# Patient Record
Sex: FEMALE | Race: AMERICAN INDIAN OR ALASKA NATIVE
[De-identification: names, ages, dates, MRNs, and addresses within clinical notes are randomized per-mention and may not be internally consistent; named-entity substitution may affect disease eponyms.]

---

## 2017-01-01 ENCOUNTER — HOSPITAL ENCOUNTER (EMERGENCY)
Dept: HOSPITAL 5 - ED | Age: 32
LOS: 1 days | Discharge: HOME | End: 2017-01-02
Payer: MEDICAID

## 2017-01-01 DIAGNOSIS — J45.909: ICD-10-CM

## 2017-01-01 DIAGNOSIS — F17.200: ICD-10-CM

## 2017-01-01 DIAGNOSIS — K52.9: Primary | ICD-10-CM

## 2017-01-01 DIAGNOSIS — I10: ICD-10-CM

## 2017-01-01 PROCEDURE — 83690 ASSAY OF LIPASE: CPT

## 2017-01-01 PROCEDURE — 96372 THER/PROPH/DIAG INJ SC/IM: CPT

## 2017-01-01 PROCEDURE — 84703 CHORIONIC GONADOTROPIN ASSAY: CPT

## 2017-01-01 PROCEDURE — 81001 URINALYSIS AUTO W/SCOPE: CPT

## 2017-01-01 PROCEDURE — 85025 COMPLETE CBC W/AUTO DIFF WBC: CPT

## 2017-01-01 PROCEDURE — 99283 EMERGENCY DEPT VISIT LOW MDM: CPT

## 2017-01-01 PROCEDURE — 36415 COLL VENOUS BLD VENIPUNCTURE: CPT

## 2017-01-01 PROCEDURE — 80053 COMPREHEN METABOLIC PANEL: CPT

## 2017-01-02 VITALS — SYSTOLIC BLOOD PRESSURE: 119 MMHG | DIASTOLIC BLOOD PRESSURE: 77 MMHG

## 2017-01-02 LAB
ALBUMIN SERPL-MCNC: 4.6 G/DL (ref 3.9–5)
ALBUMIN/GLOB SERPL: 1.4 %
ALP SERPL-CCNC: 68 UNITS/L (ref 35–129)
ALT SERPL-CCNC: 23 UNITS/L (ref 7–56)
ANION GAP SERPL CALC-SCNC: 17 MMOL/L
BASOPHILS NFR BLD AUTO: 0.4 % (ref 0–1.8)
BILIRUB SERPL-MCNC: 0.3 MG/DL (ref 0.1–1.2)
BILIRUB UR QL STRIP: (no result)
BLOOD UR QL VISUAL: (no result)
BUN SERPL-MCNC: 13 MG/DL (ref 7–17)
BUN/CREAT SERPL: 13 %
CALCIUM SERPL-MCNC: 8.8 MG/DL (ref 8.4–10.2)
CHLORIDE SERPL-SCNC: 96.6 MMOL/L (ref 98–107)
CO2 SERPL-SCNC: 24 MMOL/L (ref 22–30)
EOSINOPHIL NFR BLD AUTO: 2.9 % (ref 0–4.3)
GLUCOSE SERPL-MCNC: 105 MG/DL (ref 65–100)
HCT VFR BLD CALC: 45.8 % (ref 30.3–42.9)
HGB BLD-MCNC: 15.2 GM/DL (ref 10.1–14.3)
KETONES UR STRIP-MCNC: (no result) MG/DL
LEUKOCYTE ESTERASE UR QL STRIP: (no result)
LIPASE SERPL-CCNC: 28 UNITS/L (ref 13–60)
MCH RBC QN AUTO: 27 PG (ref 28–32)
MCHC RBC AUTO-ENTMCNC: 33 % (ref 30–34)
MCV RBC AUTO: 83 FL (ref 79–97)
MUCOUS THREADS #/AREA URNS HPF: (no result) /HPF
NITRITE UR QL STRIP: (no result)
PH UR STRIP: 6 [PH] (ref 5–7)
PLATELET # BLD: 255 K/MM3 (ref 140–440)
POTASSIUM SERPL-SCNC: 3.6 MMOL/L (ref 3.6–5)
PROT SERPL-MCNC: 8 G/DL (ref 6.3–8.2)
RBC # BLD AUTO: 5.54 M/MM3 (ref 3.65–5.03)
RBC #/AREA URNS HPF: > 182 /HPF (ref 0–6)
SODIUM SERPL-SCNC: 134 MMOL/L (ref 137–145)
UROBILINOGEN UR-MCNC: < 2 MG/DL (ref ?–2)
WBC # BLD AUTO: 10.1 K/MM3 (ref 4.5–11)
WBC #/AREA URNS HPF: 27 /HPF (ref 0–6)

## 2017-01-02 NOTE — EMERGENCY DEPARTMENT REPORT
ED Abdominal Pain HPI





- General


Chief Complaint: Abdominal Pain


Stated Complaint: STOMACH PAIN/CHILLS,UNABLE TO EAT


Time Seen by Provider: 01/02/17 06:46


Source: patient


Mode of arrival: Ambulatory


Limitations: No Limitations





- History of Present Illness


Initial Comments: 





31-year-old female presents to the emergency department complaining of 

abdominal pain and diarrhea for the past 3 days.  Patient describes constant, 

sharp mid abdominal pain that does not radiate.  She reports decreased appetite

, but denies nausea or vomiting.  There has been no fever.  Patient states she 

has been using TheraFlu and Pepto-Bismol with little relief.  There are no 

other complaints.


MD Complaint: abdominal pain


-: Gradual, days(s) (3)


Location: periumbilical


Radiation: none


Migration to: no migration


Severity scale (0 -10): 5


Quality: sharp


Consistency: constant


Improves With: nothing


Worsens With: nothing


Associated Symptoms: diarrhea, anorexia





- Related Data


 Home Medications











 Medication  Instructions  Recorded  Confirmed  Last Taken


 


Valsartan-Hctz 160-25 mg Tab 1 tab PO DAILY 01/02/17 01/02/17 Unknown








 Previous Rx's











 Medication  Instructions  Recorded  Last Taken  Type


 


Diphenoxylate HCl/Atropine 1 each PO TID PRN #10 tablet 01/02/17 Unknown Rx





[Lomotil 2.5-0.025 mg Tablet]    


 


HYDROcodone/APAP 5-325 [[place] 1 each PO Q6HR PRN #20 tablet 01/02/17 Unknown Rx





5/325]    











 Allergies











Allergy/AdvReac Type Severity Reaction Status Date / Time


 


No Known Allergies Allergy   Unverified 03/11/16 01:16














ED Review of Systems


ROS: 


Stated complaint: STOMACH PAIN/CHILLS,UNABLE TO EAT


Other details as noted in HPI





Comment: All other systems reviewed and negative


Gastrointestinal: abdominal pain, diarrhea





ED Past Medical Hx





- Past Medical History


Previous Medical History?: Yes


Hx Hypertension: Yes


Hx Asthma: Yes





- Surgical History


Past Surgical History?: No





- Family History


Family history: no significant





- Social History


Smoking Status: Heavy Tobacco Smoker


Substance Use Type: Alcohol





- Medications


Home Medications: 


 Home Medications











 Medication  Instructions  Recorded  Confirmed  Last Taken  Type


 


Diphenoxylate HCl/Atropine 1 each PO TID PRN #10 tablet 01/02/17  Unknown Rx





[Lomotil 2.5-0.025 mg Tablet]     


 


HYDROcodone/APAP 5-325 [[place] 1 each PO Q6HR PRN #20 tablet 01/02/17  Unknown Rx





5/325]     


 


Valsartan-Hctz 160-25 mg Tab 1 tab PO DAILY 01/02/17 01/02/17 Unknown History














ED Physical Exam





- General


Limitations: No Limitations


General appearance: alert, in no apparent distress





- Head


Head exam: Present: atraumatic, normocephalic





- Eye


Eye exam: Present: normal appearance, PERRL, EOMI





- ENT


ENT exam: Present: normal exam, normal orophraynx, mucous membranes moist





- Neck


Neck exam: Present: normal inspection, full ROM.  Absent: tenderness





- Respiratory


Respiratory exam: Present: normal lung sounds bilaterally.  Absent: respiratory 

distress





- Cardiovascular


Cardiovascular Exam: Present: regular rate, normal rhythm, normal heart sounds





- GI/Abdominal


GI/Abdominal exam: Present: soft, tenderness (mild periumbilical tenderness to 

palpation.), normal bowel sounds.  Absent: distended, guarding, rebound





- Extremities Exam


Extremities exam: Present: normal inspection, full ROM.  Absent: tenderness





- Back Exam


Back exam: Present: normal inspection, full ROM.  Absent: tenderness





- Neurological Exam


Neurological exam: Present: alert, oriented X3.  Absent: motor sensory deficit





- Skin


Skin exam: Present: warm, dry, intact





ED Course


 Vital Signs











  01/02/17 01/02/17 01/02/17





  01:20 05:30 06:10


 


Temperature 99.3 F 98.5 F 98.3 F


 


Pulse Rate 104 H 96 H 84


 


Respiratory 18 18 18





Rate   


 


Blood Pressure 137/103 139/102 


 


Blood Pressure   135/98





[Left]   


 


O2 Sat by Pulse 99 100 99





Oximetry   














  01/02/17





  07:20


 


Temperature 98.1 F


 


Pulse Rate 90


 


Respiratory 18





Rate 


 


Blood Pressure 


 


Blood Pressure 142/90





[Left] 


 


O2 Sat by Pulse 99





Oximetry 














ED Medical Decision Making





- Lab Data


Result diagrams: 


 01/02/17 02:10





 01/02/17 02:10





- Medical Decision Making





Lab results reviewed and discussed with the patient.  Patient reports some 

relief of pain with medication.  Patient will be discharged home at this time.





- Differential Diagnosis


enteritis, dehydration, electrolyte abnormality


Critical care attestation.: 


If time is entered above; I have spent that time in minutes in the direct care 

of this critically ill patient, excluding procedure time.








ED Disposition


Clinical Impression: 


 Enteritis


Disposition: DISCHARGED TO HOME OR SELFCARE


Is pt being admited?: No


Condition: Stable


Instructions:  Abdominal Pain (ED), Acute Diarrhea (ED)


Prescriptions: 


Diphenoxylate HCl/Atropine [Lomotil 2.5-0.025 mg Tablet] 1 each PO TID PRN #10 

tablet


 PRN Reason: Diarrhea


HYDROcodone/APAP 5-325 [[place] 5/325] 1 each PO Q6HR PRN #20 tablet


 PRN Reason: Pain


Referrals: 


PRIMARY CARE,MD [Primary Care Provider] - 3-5 Days


Time of Disposition: 09:13

## 2018-02-27 ENCOUNTER — HOSPITAL ENCOUNTER (EMERGENCY)
Dept: HOSPITAL 5 - ED | Age: 33
LOS: 1 days | Discharge: HOME | End: 2018-02-28
Payer: MEDICAID

## 2018-02-27 DIAGNOSIS — R22.1: Primary | ICD-10-CM

## 2018-02-27 DIAGNOSIS — J45.909: ICD-10-CM

## 2018-02-27 DIAGNOSIS — F17.200: ICD-10-CM

## 2018-02-27 DIAGNOSIS — I10: ICD-10-CM

## 2018-02-27 PROCEDURE — 99284 EMERGENCY DEPT VISIT MOD MDM: CPT

## 2018-02-27 PROCEDURE — 80053 COMPREHEN METABOLIC PANEL: CPT

## 2018-02-27 PROCEDURE — 36415 COLL VENOUS BLD VENIPUNCTURE: CPT

## 2018-02-27 PROCEDURE — 72125 CT NECK SPINE W/O DYE: CPT

## 2018-02-27 PROCEDURE — 84439 ASSAY OF FREE THYROXINE: CPT

## 2018-02-27 PROCEDURE — 84443 ASSAY THYROID STIM HORMONE: CPT

## 2018-02-27 PROCEDURE — 85025 COMPLETE CBC W/AUTO DIFF WBC: CPT

## 2018-02-27 PROCEDURE — 70450 CT HEAD/BRAIN W/O DYE: CPT

## 2018-02-27 NOTE — CAT SCAN REPORT
FINAL REPORT



PROCEDURE:  CT CERVICAL SPINE WO CON



TECHNIQUE:  Computerized tomography of the cervical spine was

performed from the skull base to T1 without contrast material. 



HISTORY:  neck pain 



COMPARISON:  No prior studies are available for comparison.



FINDINGS:  

Vertebral height is within normal limits. There is loss of

cervical lordosis. Prevertebral soft tissues are within normal

limits. Left lobe thyroid demonstrates a 9 millimeter hypodense

nodule. Visualized lung apices are clear. An acute fracture is

not identified. 



C1-2: No significant abnormality.



C2-3: No significant abnormality.



C3-4: No significant abnormality.



C4-5: No significant abnormality.



C5-6: No significant abnormality.



C6-7: No significant abnormality.



C7-T1: No significant abnormality.



Other: No additional findings.



IMPRESSION:  

Straightening of the cervical spine is most likely secondary to

spasm positioning.



No acute fracture



No obvious spinal canal or neural foraminal compromise



Left lobe thyroid nodule. Ultrasound evaluation is recommended..

## 2018-02-27 NOTE — CAT SCAN REPORT
FINAL REPORT



PROCEDURE:  CT HEAD/BRAIN WO CON



TECHNIQUE:  Computerized tomography of the head was performed

without contrast material. 



HISTORY:  headache 



COMPARISON:  No prior studies are available for comparison.



FINDINGS:  

Skull and scalp: Normal.



Paranasal sinuses: Normal.



Ventricles and subarachnoid spaces: Normal.



Cerebrum: No evidence of hemorrhage, acute infarction or mass .



Cerebellum and brainstem: No evidence of hemorrhage, acute

infarction or mass.



Vasculature: Normal.



Comments: None.



IMPRESSION:  

Normal Examination

## 2018-02-28 VITALS — SYSTOLIC BLOOD PRESSURE: 166 MMHG | DIASTOLIC BLOOD PRESSURE: 94 MMHG

## 2018-02-28 LAB
ALBUMIN SERPL-MCNC: 4.1 G/DL (ref 3.9–5)
ALT SERPL-CCNC: 9 UNITS/L (ref 7–56)
BASOPHILS # (AUTO): 0.1 K/MM3 (ref 0–0.1)
BASOPHILS NFR BLD AUTO: 0.9 % (ref 0–1.8)
BUN SERPL-MCNC: 10 MG/DL (ref 7–17)
BUN/CREAT SERPL: 13 %
CALCIUM SERPL-MCNC: 8.8 MG/DL (ref 8.4–10.2)
EOSINOPHIL # BLD AUTO: 0.4 K/MM3 (ref 0–0.4)
EOSINOPHIL NFR BLD AUTO: 4.3 % (ref 0–4.3)
HCT VFR BLD CALC: 38.1 % (ref 30.3–42.9)
HEMOLYSIS INDEX: 12
HGB BLD-MCNC: 12.9 GM/DL (ref 10.1–14.3)
LYMPHOCYTES # BLD AUTO: 2.3 K/MM3 (ref 1.2–5.4)
LYMPHOCYTES NFR BLD AUTO: 23.3 % (ref 13.4–35)
MCH RBC QN AUTO: 28 PG (ref 28–32)
MCHC RBC AUTO-ENTMCNC: 34 % (ref 30–34)
MCV RBC AUTO: 82 FL (ref 79–97)
MONOCYTES # (AUTO): 1 K/MM3 (ref 0–0.8)
MONOCYTES % (AUTO): 9.9 % (ref 0–7.3)
PLATELET # BLD: 239 K/MM3 (ref 140–440)
RBC # BLD AUTO: 4.62 M/MM3 (ref 3.65–5.03)
T4 FREE SERPL-MCNC: 1.01 NG/DL (ref 0.76–1.46)

## 2018-02-28 NOTE — EMERGENCY DEPARTMENT REPORT
ED Neck Pain/Injury HPI





- General


Chief Complaint: Neck Pain/Injury


Stated Complaint: NECK PAIN


Time Seen by Provider: 18 01:44


Mode of arrival: Ambulatory


Limitations: No Limitations





- History of Present Illness


Initial Comments: 





Patient is 32 years old female with no significant past medical history 

presented with diffuse neck swelling for the last week.  Patient stated that 

she had the same symptoms last here and went to [place] and they gave her some 

medicine that indeed helped and reduce the swelling.  Patient denied any 

history of thyroid problem before.  She denied any fever, difficulty breathing 

difficulty swallowing.


MD Complaint: neck pain


-: Gradual





- Related Data


 Home Medications











 Medication  Instructions  Recorded  Confirmed  Last Taken


 


Valsartan-Hctz 160-25 mg Tab 1 tab PO DAILY 17 Unknown








 Previous Rx's











 Medication  Instructions  Recorded  Last Taken  Type


 


Diphenoxylate HCl/Atropine 1 each PO TID PRN #10 tablet 17 Unknown Rx





[Lomotil 2.5-0.025 mg Tablet]    


 


HYDROcodone/APAP 5-325 [[place] 1 each PO Q6HR PRN #20 tablet 17 Unknown Rx





5/325]    


 


Naproxen [Naprosyn] 500 mg PO BID #14 tablet 18 Unknown Rx











 Allergies











Allergy/AdvReac Type Severity Reaction Status Date / Time


 


No Known Allergies Allergy   Verified 18 17:38














ED Review of Systems


ROS: 


Stated complaint: NECK PAIN


Other details as noted in HPI





Comment: All other systems reviewed and negative


Constitutional: denies: chills, fever


Respiratory: denies: cough, orthopnea, shortness of breath, SOB with exertion, 

SOB at rest, wheezing


Cardiovascular: denies: chest pain, palpitations, dyspnea on exertion, orthopnea

, edema, syncope, paroxysmal nocturnal dyspnea


Gastrointestinal: denies: abdominal pain, nausea, vomiting, diarrhea, 

constipation


Skin: denies: rash, lesions


Neurological: denies: headache, weakness, numbness, paresthesias





ED Past Medical Hx





- Past Medical History


Hx Hypertension: Yes


Hx Asthma: Yes





- Surgical History


Past Surgical History?: No





- Social History


Smoking Status: Current Every Day Smoker


Substance Use Type: Alcohol





- Medications


Home Medications: 


 Home Medications











 Medication  Instructions  Recorded  Confirmed  Last Taken  Type


 


Diphenoxylate HCl/Atropine 1 each PO TID PRN #10 tablet 17  Unknown Rx





[Lomotil 2.5-0.025 mg Tablet]     


 


HYDROcodone/APAP 5-325 [[place] 1 each PO Q6HR PRN #20 tablet 17  Unknown Rx





5/325]     


 


Valsartan-Hctz 160-25 mg Tab 1 tab PO DAILY 17 Unknown History


 


Naproxen [Naprosyn] 500 mg PO BID #14 tablet 18  Unknown Rx














ED Physical Exam





- General


Limitations: No Limitations


General appearance: alert, in no apparent distress





- Head


Head exam: Present: atraumatic, normocephalic





- Eye


Eye exam: Present: normal appearance, PERRL





- ENT


ENT exam: Present: normal exam, normal orophraynx, mucous membranes moist





- Neck


Neck exam: Present: tenderness, full ROM, thyromegaly.  Absent: meningismus, 

lymphadenopathy





- Respiratory


Respiratory exam: Present: normal lung sounds bilaterally.  Absent: respiratory 

distress, wheezes, rales, rhonchi, chest wall tenderness, accessory muscle use, 

decreased breath sounds, prolonged expiratory





- Cardiovascular


Cardiovascular Exam: Present: regular rate, normal rhythm, normal heart sounds





- GI/Abdominal


GI/Abdominal exam: Present: soft, normal bowel sounds.  Absent: distended, 

tenderness, guarding, rebound, organomegaly, mass, bruit, pulsatile mass, hernia





- Extremities Exam


Extremities exam: Present: normal inspection, full ROM, normal capillary refill





- Back Exam


Back exam: Present: normal inspection, full ROM.  Absent: tenderness, CVA 

tenderness (R), CVA tenderness (L)





- Neurological Exam


Neurological exam: Present: alert, oriented X3, CN II-XII intact, normal gait





- Skin


Skin exam: Present: warm, intact, normal color.  Absent: cyanosis, diaphoretic, 

erythema





ED Course


 Vital Signs











  18





  17:38 01:43 01:48


 


Temperature 98.9 F  98.6 F


 


Pulse Rate 104 H  77


 


Respiratory 16 16 16





Rate   


 


Blood Pressure 127/80  


 


Blood Pressure   155/106





[Left]   


 


O2 Sat by Pulse 96  100





Oximetry   














ED Medical Decision Making





- Lab Data


Result diagrams: 


 18 01:55





 18 01:55





- Radiology Data


Radiology results: report reviewed





Referring Physician:   RACHEL OSHEA


Patient Name:   CK POSEY


Patient ID:   [id number]


YOB: 1985


Sex:   Female


Accession:   [id number]


Report Date:   2018


Report Status:   Finalized


Findings


[facility] 


[address] [place] [id number] 





Cat Scan Report 


Signed 





Patient: CK POSEY MR#: [id number] 


: 1985 Acct:[id number] 


Age/Sex: 32 / F ADM Date: 18 


Loc: ED 


Attending Dr: 








Ordering Physician: RACHEL OSHEA MD 


Date of Service: 18 


Procedure(s): CT cervical spine wo con 


Accession Number(s): [id number] 





cc: RACHEL OSHEA MD 








FINAL REPORT 





PROCEDURE: CT CERVICAL SPINE WO CON 





TECHNIQUE: Computerized tomography of the cervical spine was 


performed from the skull base to T1 without contrast material. 





HISTORY: neck pain 





COMPARISON: No prior studies are available for comparison. 





FINDINGS: 


Vertebral height is within normal limits. There is loss of 


cervical lordosis. Prevertebral soft tissues are within normal 


limits. Left lobe thyroid demonstrates a 9 millimeter hypodense 


nodule. Visualized lung apices are clear. An acute fracture is 


not identified. 





C1-2: No significant abnormality. 





C2-3: No significant abnormality. 





C3-4: No significant abnormality. 





C4-5: No significant abnormality. 





C5-6: No significant abnormality. 





C6-7: No significant abnormality. 





C7-T1: No significant abnormality. 





Other: No additional findings. 





IMPRESSION: 


Straightening of the cervical spine is most likely secondary to 


spasm positioning. 





No acute fracture 





No obvious spinal canal or neural foraminal compromise 





Left lobe thyroid nodule. Ultrasound evaluation is recommended.. 











Transcribed By: [facility] 


Dictated By: RAINA HUANG 


Electronically Authenticated By: RAINA HUANG 


Signed Date/Time: 18 











DD/DT: 18 


TD/TT: 18





- Medical Decision Making





I informed the patient about our CT neck which she showed a mass in the left 

thyroid lobe.  I strongly advised the patient to follow-up with her primary 

care physician for further management and testing because of this could be a 

cancer.  Patient understand and she stated that she will follow-up with the 

primary care physician.


Critical care attestation.: 


If time is entered above; I have spent that time in minutes in the direct care 

of this critically ill patient, excluding procedure time.








ED Disposition


Clinical Impression: 


 Neck mass, Thyroid mass





Disposition:  TO HOME OR SELFCARE


Is pt being admited?: No


Condition: Stable


Instructions:  Thyroid Goiter (ED)


Prescriptions: 


Naproxen [Naprosyn] 500 mg PO BID #14 tablet


Referrals: 


MATHEUS MOLINA MD [Referring] - 3-5 Days

## 2019-08-06 ENCOUNTER — HOSPITAL ENCOUNTER (EMERGENCY)
Dept: HOSPITAL 5 - ED | Age: 34
Discharge: HOME | End: 2019-08-06
Payer: MEDICAID

## 2019-08-06 VITALS — DIASTOLIC BLOOD PRESSURE: 118 MMHG | SYSTOLIC BLOOD PRESSURE: 201 MMHG

## 2019-08-06 DIAGNOSIS — Z76.0: ICD-10-CM

## 2019-08-06 DIAGNOSIS — Z79.899: ICD-10-CM

## 2019-08-06 DIAGNOSIS — I10: Primary | ICD-10-CM

## 2019-08-06 DIAGNOSIS — J45.909: ICD-10-CM

## 2019-08-06 LAB
BASOPHILS # (AUTO): 0.1 K/MM3 (ref 0–0.1)
BASOPHILS NFR BLD AUTO: 0.9 % (ref 0–1.8)
BUN SERPL-MCNC: 8 MG/DL (ref 7–17)
BUN/CREAT SERPL: 13 %
CALCIUM SERPL-MCNC: 8.9 MG/DL (ref 8.4–10.2)
EOSINOPHIL # BLD AUTO: 0.1 K/MM3 (ref 0–0.4)
EOSINOPHIL NFR BLD AUTO: 1.3 % (ref 0–4.3)
HCT VFR BLD CALC: 38.9 % (ref 30.3–42.9)
HEMOLYSIS INDEX: 12
HGB BLD-MCNC: 12.7 GM/DL (ref 10.1–14.3)
LYMPHOCYTES # BLD AUTO: 1.9 K/MM3 (ref 1.2–5.4)
LYMPHOCYTES NFR BLD AUTO: 22.5 % (ref 13.4–35)
MCHC RBC AUTO-ENTMCNC: 33 % (ref 30–34)
MCV RBC AUTO: 83 FL (ref 79–97)
MONOCYTES # (AUTO): 0.7 K/MM3 (ref 0–0.8)
MONOCYTES % (AUTO): 8.6 % (ref 0–7.3)
PLATELET # BLD: 250 K/MM3 (ref 140–440)
RBC # BLD AUTO: 4.68 M/MM3 (ref 3.65–5.03)

## 2019-08-06 PROCEDURE — 80048 BASIC METABOLIC PNL TOTAL CA: CPT

## 2019-08-06 PROCEDURE — 85025 COMPLETE CBC W/AUTO DIFF WBC: CPT

## 2019-08-06 PROCEDURE — 93010 ELECTROCARDIOGRAM REPORT: CPT

## 2019-08-06 PROCEDURE — 93005 ELECTROCARDIOGRAM TRACING: CPT

## 2019-08-06 PROCEDURE — 36415 COLL VENOUS BLD VENIPUNCTURE: CPT

## 2019-08-06 PROCEDURE — 71046 X-RAY EXAM CHEST 2 VIEWS: CPT

## 2019-08-06 NOTE — EVENT NOTE
ED Screening Note


Date of service: 08/06/19


Time: 16:09


ED Screening Note: 


34 y/o female comes in for elevated bp.  Has been off her Amlodipine 5mg for 1 

month.  Now has HA and Chest pain. 





This initial assessment/diagnostic orders/clinical plan/treatment(s) is/are 

subject to change based on patients health status, clinical progression and re-

assessment by fellow clinical providers in the ED. Further treatment and workup 

at subsequent clinical providers discretion. Patient/guardian urged not to elope

from the ED as their condition may be serious if not clinically assessed and 

managed. 





Initial orders include:

## 2019-08-06 NOTE — XRAY REPORT
CHEST 2 VIEWS 



INDICATION / CLINICAL INFORMATION:

Chest pain, elevated blood pressure.



COMPARISON: 

None available.



FINDINGS:



SUPPORT DEVICES: None.



HEART / MEDIASTINUM: No significant abnormality. 



LUNGS / PLEURA: No significant pulmonary or pleural abnormality. No pneumothorax. 



ADDITIONAL FINDINGS: No significant additional findings.



IMPRESSION:

1. No acute findings.



Signer Name: Isidoro Quiroz MD 

Signed: 8/6/2019 4:34 PM

 Workstation Name: [id number]

## 2019-08-06 NOTE — EMERGENCY DEPARTMENT REPORT
ED General Adult HPI





- General


Chief complaint: High BP


Stated complaint: SOB/LT SIDE PAIN/HEADACHE


Time Seen by Provider: 19 16:09


Source: patient


Mode of arrival: Ambulatory


Limitations: No Limitations





- History of Present Illness


Initial comments: 


32 y/o female comes in for elevated bp.  Has been off her Amlodipine 5mg for 1 

month.  states she had HA and Chest pain on yesterday symptoms have resolved 

now, pt denies cp no sob no dizziness no lightheadeness no activity intolerance,

 headache is 4/10 , frontal aching. 





Onset/Timin


-: days(s)


Location: head


Radiation: non-radiation


Severity scale (0 -10): 4


Quality: aching


Consistency: constant


Improves with: none


Worsens with: none


Associated Symptoms: headaches.  denies: confusion, chest pain, cough, 

diaphoresis, fever/chills, loss of appetite, malaise, nausea/vomiting, rash, 

seizure, shortness of breath, syncope, weakness


Treatments Prior to Arrival: none





- Related Data


                                Home Medications











 Medication  Instructions  Recorded  Confirmed  Last Taken


 


Valsartan-Hctz 160-25 mg Tab 1 tab PO DAILY 17 Unknown








                                  Previous Rx's











 Medication  Instructions  Recorded  Last Taken  Type


 


Diphenoxylate HCl/Atropine 1 each PO TID PRN #10 tablet 17 Unknown Rx





[Lomotil 2.5-0.025 mg Tablet]    


 


HYDROcodone/APAP 5-325 [[place] 1 each PO Q6HR PRN #20 tablet 17 Unknown Rx





5/325]    


 


Naproxen [Naprosyn] 500 mg PO BID #14 tablet 18 Unknown Rx


 


amLODIPine [Norvasc] 5 mg PO DAILY #30 tab 19 Unknown Rx











                                    Allergies











Allergy/AdvReac Type Severity Reaction Status Date / Time


 


No Known Allergies Allergy   Verified 18 17:38














ED Review of Systems


ROS: 


Stated complaint: SOB/LT SIDE PAIN/HEADACHE


Other details as noted in HPI





Constitutional: denies: chills, fever


Eyes: denies: eye pain, eye discharge, vision change


ENT: denies: ear pain, throat pain


Respiratory: denies: cough, shortness of breath, wheezing


Cardiovascular: denies: chest pain, palpitations


Endocrine: no symptoms reported


Gastrointestinal: denies: abdominal pain, nausea, diarrhea


Genitourinary: denies: urgency, dysuria, discharge


Musculoskeletal: denies: back pain, joint swelling, arthralgia


Skin: denies: rash, lesions


Neurological: headache.  denies: weakness, numbness, paresthesias, confusion, a

bnormal gait, vertigo


Psychiatric: denies: anxiety, depression


Hematological/Lymphatic: denies: easy bleeding, easy bruising





ED Past Medical Hx





- Past Medical History


Hx Hypertension: Yes


Hx Asthma: Yes





- Surgical History


Past Surgical History?: No





- Social History


Smoking Status: Never Smoker


Substance Use Type: None





- Medications


Home Medications: 


                                Home Medications











 Medication  Instructions  Recorded  Confirmed  Last Taken  Type


 


Diphenoxylate HCl/Atropine 1 each PO TID PRN #10 tablet 17  Unknown Rx





[Lomotil 2.5-0.025 mg Tablet]     


 


HYDROcodone/APAP 5-325 [[place] 1 each PO Q6HR PRN #20 tablet 17  Unknown Rx





5/325]     


 


Valsartan-Hctz 160-25 mg Tab 1 tab PO DAILY 17 Unknown History


 


Naproxen [Naprosyn] 500 mg PO BID #14 tablet 18  Unknown Rx


 


amLODIPine [Norvasc] 5 mg PO DAILY #30 tab 19  Unknown Rx














ED Physical Exam





- General


Limitations: No Limitations


General appearance: alert, in no apparent distress





- Head


Head exam: Present: atraumatic, normocephalic





- Eye


Eye exam: Present: normal appearance, PERRL, EOMI.  Absent: conjunctival 

injection, nystagmus


Pupils: Present: normal accommodation





- ENT


ENT exam: Present: normal orophraynx, mucous membranes moist, TM's normal 

bilaterally, normal external ear exam





- Neck


Neck exam: Present: normal inspection, full ROM.  Absent: tenderness, 

lymphadenopathy, thyromegaly





- Expanded Neck Exam


  ** Expanded


Neck exam: Absent: tenderness, midline deformity, anterior neck swelling, 

thyroid mass, carotid bruit, tracheal deviation





- Respiratory


Respiratory exam: Present: normal lung sounds bilaterally.  Absent: respiratory 

distress, wheezes, stridor, chest wall tenderness





- Cardiovascular


Cardiovascular Exam: Present: regular rate, normal rhythm, normal heart sounds. 

Absent: systolic murmur, diastolic murmur, rubs, gallop





- GI/Abdominal


GI/Abdominal exam: Present: soft, normal bowel sounds.  Absent: distended, 

tenderness, bruit, hernia





- Rectal


Rectal exam: Present: deferred





- Extremities Exam


Extremities exam: Present: normal inspection, full ROM, normal capillary refill.

 Absent: tenderness, pedal edema, joint swelling, calf tenderness





- Back Exam


Back exam: Present: normal inspection, full ROM.  Absent: tenderness, CVA 

tenderness (R), CVA tenderness (L), muscle spasm, paraspinal tenderness, rash 

noted





- Neurological Exam


Neurological exam: Present: alert, oriented X3, CN II-XII intact, normal gait, 

reflexes normal.  Absent: motor sensory deficit





- Psychiatric


Psychiatric exam: Present: normal affect, normal mood





- Skin


Skin exam: Present: warm, dry, intact, normal color.  Absent: rash





ED Course





                                   Vital Signs











  19





  16:07


 


Temperature 98.8 F


 


Pulse Rate 93 H


 


Respiratory 18





Rate 


 


Blood Pressure 166/114


 


O2 Sat by Pulse 97





Oximetry 














ED Medical Decision Making





- Lab Data


Result diagrams: 


                                 19 16:33





                                 19 16:33








Labs











  19





  16:33 16:33


 


WBC  8.5 


 


RBC  4.68 


 


Hgb  12.7 


 


Hct  38.9 


 


MCV  83 


 


MCH  27 L 


 


MCHC  33 


 


RDW  15.5 H 


 


Plt Count  250 


 


Lymph % (Auto)  22.5 


 


Mono % (Auto)  8.6 H 


 


Eos % (Auto)  1.3 


 


Baso % (Auto)  0.9 


 


Lymph #  1.9 


 


Mono #  0.7 


 


Eos #  0.1 


 


Baso #  0.1 


 


Seg Neutrophils %  66.7 


 


Seg Neutrophils #  5.7 


 


Sodium   138


 


Potassium   3.7


 


Chloride   104.4


 


Carbon Dioxide   23


 


Anion Gap   14


 


BUN   8


 


Creatinine   0.6 L


 


Estimated GFR   > 60


 


BUN/Creatinine Ratio   13


 


Glucose   96


 


Calcium   8.9














- EKG Data


EKG shows normal: sinus rhythm, axis, intervals, QRS complexes, ST-T waves


Rate: normal





- EKG Data


When compared to previous EKG there are: previous EKG unavailable


Interpretation: normal EKG (EKG interp by ed attending NSR no ST Elevated MI )





- Radiology Data


Radiology results: report reviewed, image reviewed


 normal CXR no infiltrates no opacities








- Medical Decision Making


 Headache improved, ekg and cxr normal, plan, discharge will rx for bp 

medications, pt will pickup medications today from pharmacy, and follow up with 

[facility] in 2-3 days 





Critical care attestation.: 


If time is entered above; I have spent that time in minutes in the direct care 

of this critically ill patient, excluding procedure time.








ED Disposition


Clinical Impression: 


 Benign essential HTN, Medication refill





Disposition:  TO HOME OR SELFCARE


Is pt being admited?: No


Does the pt Need Aspirin: No


Condition: Stable


Instructions:  Hypertension (ED), Amlodipine (By mouth)


Prescriptions: 


amLODIPine [Norvasc] 5 mg PO DAILY #30 tab


Referrals: 


[facility] [[facility] - 3-5 Days


Forms:  Work/School Release Form(ED)


Time of Disposition: 19:31

## 2021-03-18 ENCOUNTER — HOSPITAL ENCOUNTER (EMERGENCY)
Dept: HOSPITAL 5 - ED | Age: 36
Discharge: HOME | End: 2021-03-18
Payer: COMMERCIAL

## 2021-03-18 VITALS — DIASTOLIC BLOOD PRESSURE: 92 MMHG | SYSTOLIC BLOOD PRESSURE: 121 MMHG

## 2021-03-18 DIAGNOSIS — Z79.899: ICD-10-CM

## 2021-03-18 DIAGNOSIS — J45.909: ICD-10-CM

## 2021-03-18 DIAGNOSIS — F17.200: ICD-10-CM

## 2021-03-18 DIAGNOSIS — I10: ICD-10-CM

## 2021-03-18 DIAGNOSIS — R10.2: ICD-10-CM

## 2021-03-18 DIAGNOSIS — N30.01: Primary | ICD-10-CM

## 2021-03-18 DIAGNOSIS — Z98.890: ICD-10-CM

## 2021-03-18 DIAGNOSIS — Z79.1: ICD-10-CM

## 2021-03-18 LAB
ALBUMIN SERPL-MCNC: 4.4 G/DL (ref 3.9–5)
ALT SERPL-CCNC: 14 UNITS/L (ref 7–56)
BASOPHILS # (AUTO): 0.1 K/MM3 (ref 0–0.1)
BASOPHILS NFR BLD AUTO: 0.6 % (ref 0–1.8)
BILIRUB UR QL STRIP: (no result)
BLOOD UR QL VISUAL: (no result)
BUN SERPL-MCNC: 13 MG/DL (ref 7–17)
BUN/CREAT SERPL: 16 %
CALCIUM SERPL-MCNC: 8.6 MG/DL (ref 8.4–10.2)
EOSINOPHIL # BLD AUTO: 0.2 K/MM3 (ref 0–0.4)
EOSINOPHIL NFR BLD AUTO: 1.2 % (ref 0–4.3)
HCT VFR BLD CALC: 38.1 % (ref 30.3–42.9)
HEMOLYSIS INDEX: 10
HGB BLD-MCNC: 12.8 GM/DL (ref 10.1–14.3)
LYMPHOCYTES # BLD AUTO: 2.2 K/MM3 (ref 1.2–5.4)
LYMPHOCYTES NFR BLD AUTO: 12.2 % (ref 13.4–35)
MCHC RBC AUTO-ENTMCNC: 34 % (ref 30–34)
MCV RBC AUTO: 83 FL (ref 79–97)
MONOCYTES # (AUTO): 1.3 K/MM3 (ref 0–0.8)
MONOCYTES % (AUTO): 7 % (ref 0–7.3)
MUCOUS THREADS #/AREA URNS HPF: (no result) /HPF
PH UR STRIP: 5 [PH] (ref 5–7)
PLATELET # BLD: 294 K/MM3 (ref 140–440)
RBC # BLD AUTO: 4.59 M/MM3 (ref 3.65–5.03)
RBC #/AREA URNS HPF: 110 /HPF (ref 0–6)
UROBILINOGEN UR-MCNC: < 2 MG/DL (ref ?–2)
WBC #/AREA URNS HPF: > 182 /HPF (ref 0–6)

## 2021-03-18 PROCEDURE — 81001 URINALYSIS AUTO W/SCOPE: CPT

## 2021-03-18 PROCEDURE — 80053 COMPREHEN METABOLIC PANEL: CPT

## 2021-03-18 PROCEDURE — 74177 CT ABD & PELVIS W/CONTRAST: CPT

## 2021-03-18 PROCEDURE — 83690 ASSAY OF LIPASE: CPT

## 2021-03-18 PROCEDURE — 96361 HYDRATE IV INFUSION ADD-ON: CPT

## 2021-03-18 PROCEDURE — 96375 TX/PRO/DX INJ NEW DRUG ADDON: CPT

## 2021-03-18 PROCEDURE — 85025 COMPLETE CBC W/AUTO DIFF WBC: CPT

## 2021-03-18 PROCEDURE — 81025 URINE PREGNANCY TEST: CPT

## 2021-03-18 PROCEDURE — 36415 COLL VENOUS BLD VENIPUNCTURE: CPT

## 2021-03-18 PROCEDURE — 99284 EMERGENCY DEPT VISIT MOD MDM: CPT

## 2021-03-18 PROCEDURE — 96365 THER/PROPH/DIAG IV INF INIT: CPT

## 2021-03-18 NOTE — EMERGENCY DEPARTMENT REPORT
ED Female  HPI





- General


Chief complaint: Urogenital-Female


Stated complaint: SEVERE ABD PAIN/PAINFUL URINATION


Source: patient


Mode of arrival: Ambulatory


Limitations: No Limitations





- History of Present Illness


Initial comments: 





Patient is a 35-year-old -American female with past medical history of 

asthma and hypertension who presents to the ED with complaint of acute onset 

persistent severe pelvic pain with dysuria, urinary frequency and urgency, 

hematuria and low back pain for the last 2 days worse in the last 12 hours.  

Patient states that she has not been able to sleep because of worsening 

suprapubic pain, dysuria and urinary frequency and urgency with hematuria.  

Patient denies fever, chills, nausea, vomiting, dizziness, syncope, traumatic 

injury, heavy lifting, dyspareunia, vaginal discharge or sore throat, cough, 

chest pain or shortness of breath.  Patient states that her LMP was 5 days ago.


MD Complaint: vaginal bleeding, dysuria, pelvic pain, other (Hematuria; urinary 

frequency and urgency)


-: Sudden, days(s) (2)


Location: suprapubic


Radiation: other (Low back )


Severity: severe


Severity scale (0 -10): 7


Quality: cramping, sharp


Consistency: constant


Improves with: none


Worsens with: urination


Are you Pregnant Now?: No


Associated Symptoms: vaginal bleeding, loss of appetite, dysuria, hematuria.  

denies: denies other symptoms





- Related Data


Sexually active: No


                                Home Medications











 Medication  Instructions  Recorded  Confirmed  Last Taken


 


Valsartan-Hctz 160-25 mg Tab 1 tab PO DAILY 17 Unknown








                                  Previous Rx's











 Medication  Instructions  Recorded  Last Taken  Type


 


Diphenoxylate HCl/Atropine 1 each PO TID PRN #10 tablet 17 Unknown Rx





[Lomotil 2.5-0.025 mg Tablet]    


 


HYDROcodone/APAP 5-325 [[place] 1 each PO Q6HR PRN #20 tablet 17 Unknown Rx





5/325]    


 


Naproxen [Naprosyn] 500 mg PO BID #14 tablet 18 Unknown Rx


 


amLODIPine 5 mg PO DAILY #30 tab 19 Unknown Rx


 


Fluconazole [Diflucan TAB] 200 mg PO QDAY #2 tablet 21 Unknown Rx


 


Ibuprofen [Motrin] 800 mg PO Q8HR PRN #30 tablet 21 Unknown Rx


 


Ondansetron [Zofran Odt] 4 mg PO Q6HR PRN #20 tab.rapdis 21 Unknown Rx


 


cephALEXin [Keflex] 500 mg PO Q6HR #40 capsule 21 Unknown Rx


 


traMADoL [Ultram] 50 mg PO Q6HR PRN #12 tablet 21 Unknown Rx











                                    Allergies











Allergy/AdvReac Type Severity Reaction Status Date / Time


 


No Known Allergies Allergy   Verified 18 17:38














ED Review of Systems


ROS: 


Stated complaint: SEVERE ABD PAIN/PAINFUL URINATION


Other details as noted in HPI





Constitutional: denies: chills, fever


Eyes: denies: eye pain, eye discharge, vision change


ENT: denies: ear pain, throat pain


Respiratory: denies: cough, shortness of breath, wheezing


Cardiovascular: denies: chest pain, palpitations


Endocrine: no symptoms reported


Gastrointestinal: denies: abdominal pain, nausea, diarrhea


Genitourinary: denies: urgency, dysuria, discharge


Musculoskeletal: denies: back pain, joint swelling, arthralgia


Skin: denies: rash, lesions


Neurological: denies: headache, weakness, paresthesias


Psychiatric: denies: anxiety, depression


Hematological/Lymphatic: denies: easy bleeding, easy bruising





ED Past Medical Hx





- Past Medical History


Hx Hypertension: Yes


Hx Asthma: Yes





- Surgical History


Additional Surgical History: cyst removal to L neck





- Social History


Smoking Status: Current Some Day Smoker





- Medications


Home Medications: 


                                Home Medications











 Medication  Instructions  Recorded  Confirmed  Last Taken  Type


 


Diphenoxylate HCl/Atropine 1 each PO TID PRN #10 tablet 17  Unknown Rx





[Lomotil 2.5-0.025 mg Tablet]     


 


HYDROcodone/APAP 5-325 [[place] 1 each PO Q6HR PRN #20 tablet 17  Unknown Rx





5/325]     


 


Valsartan-Hctz 160-25 mg Tab 1 tab PO DAILY 17 Unknown History


 


Naproxen [Naprosyn] 500 mg PO BID #14 tablet 18  Unknown Rx


 


amLODIPine 5 mg PO DAILY #30 tab 19  Unknown Rx


 


Fluconazole [Diflucan TAB] 200 mg PO QDAY #2 tablet 21  Unknown Rx


 


Ibuprofen [Motrin] 800 mg PO Q8HR PRN #30 tablet 21  Unknown Rx


 


Ondansetron [Zofran Odt] 4 mg PO Q6HR PRN #20 tab.rapdis 21  Unknown Rx


 


cephALEXin [Keflex] 500 mg PO Q6HR #40 capsule 21  Unknown Rx


 


traMADoL [Ultram] 50 mg PO Q6HR PRN #12 tablet 21  Unknown Rx














ED Physical Exam





- General


Limitations: No Limitations


General appearance: alert, in no apparent distress





- Head


Head exam: Present: atraumatic, normocephalic, normal inspection





- Eye


Eye exam: Present: normal appearance, PERRL, EOMI


Pupils: Present: normal accommodation





- ENT


ENT exam: Present: normal exam, normal orophraynx, mucous membranes moist





- Neck


Neck exam: Present: normal inspection, full ROM





- Respiratory


Respiratory exam: Present: normal lung sounds bilaterally.  Absent: respiratory 

distress, wheezes, rhonchi, stridor, chest wall tenderness, accessory muscle 

use, prolonged expiratory





- Cardiovascular


Cardiovascular Exam: Present: regular rate, normal rhythm, tachycardia.  Absent:

 systolic murmur, diastolic murmur, rubs, gallop





- GI/Abdominal


GI/Abdominal exam: Present: soft, normal bowel sounds.  Absent: tenderness, 

guarding, rebound, hyperactive bowel sounds, hypoactive bowel sounds, 

organomegaly, bruit, pulsatile mass





- Extremities Exam


Extremities exam: Present: normal inspection, full ROM, normal capillary refill





- Back Exam


Back exam: Present: normal inspection, full ROM





- Neurological Exam


Neurological exam: Present: alert, oriented X3, CN II-XII intact, normal gait, 

reflexes normal





- Psychiatric


Psychiatric exam: Present: normal affect, normal mood





- Skin


Skin exam: Present: warm, dry, intact, normal color.  Absent: rash





ED Course


                                   Vital Signs











  21





  02:07


 


Temperature 98.3 F


 


Pulse Rate 104 H


 


Respiratory 20





Rate 


 


Blood Pressure 141/92


 


O2 Sat by Pulse 98





Oximetry 














ED Medical Decision Making





- Lab Data


Result diagrams: 


                                 21 02:52





                                 21 02:52





- Radiology Data


Radiology results: report reviewed, image reviewed





[facility]  


                                     [address] [place] [address]  


 


                                          Cat Scan Report   


                                               Signed  


 


Patient: CK POSEY                                               

                 MR#  


: [id number]          


: 1985                                                                

Acct:[id number]      


 


Age/Sex: 35 / F                                                                

ADM Date: 21     


 


Loc: ED       


Attending Dr:   


 


 


Ordering Physician: ROLAND ALY  


Date of Service: 21  


Procedure(s): CT abdomen pelvis w con  


Accession Number(s): [id number]  


 


cc: ROLAND ALY   


 


 


CT abdomen pelvis w con  


 


 INDICATION:  


 Patient complains of lower abdominal / Pelvic pain.   


 


 COMPARISON: 2016  


 


 TECHNIQUE: Abdominal and pelvic CT exam performed. All CT scans at this 

location are performed 


using CT dose reduction for ALARA by means of automated exposure control.  


 


 FINDINGS:  


 


 CT ABDOMEN and PELVIS:  


 Lung Bases: No significant abnormality.  


 Liver: No significant abnormality.  


 Biliary: No significant abnormality.  


 Spleen: No significant abnormality.  


 Pancreas: No significant abnormality.  


 Adrenals: There are multiple left adrenal nodules. The largest measures 2.5 cm 

left adrenal nodule 


which previously measured 1.1 cm in the lateral limb. Hounsfield units of this 

lesion are 


approximately 55.  There also 2 other small adrenal lesions; medial adrenal 

lesion seen on image 32 


of series 2 which is similar to prior and a lesion seen on image 50 which is 

larger than prior.  


 Kidneys: Interpolar right small renal cyst.  


 Lymphatics: No lymphadenopathy.  


 Vasculature: No significant abnormality.   


 Bowel: No significant abnormality.  Normal appendix.  


 Pelvis: Mild cervical strain bladder wall thickening.  


 Osseous Structures: No aggressive osseous lesion.  


 Additional Findings: None  


 


 IMPRESSION:  


 1. Mild circumferential bladder wall thickening which could represent cystitis.

 Correlate with 


urinalysis.  


 


 2. Indeterminate left adrenal nodules. The largest and measures 2.5 cm which is

 larger than prior 


examination in which it measured 1.1 m. Recommend MRI with and without contrast 

or multiphase CT 


adrenal protocol for further characterization.  


 


 Signer Name: Timothy Del Rosario MD   


 Signed: 3/18/2021 5:15 AM  


 Workstation Name: [id number]   


 


 


Transcribed By: CS  


Dictated By: Timothy Del Rosario MD  


Electronically Authenticated By: Timothy Del Rosario MD    


Signed Date/Time: 21 0515                                


 


 


 


DD/DT: 21 0506                                                            

  


TD/TT:





























- Medical Decision Making





This is a 35-year-old -American female with past medical history of 

asthma and hypertension who presents to the ED with complaint of acute onset 

persistent severe pelvic pain with dysuria, urinary frequency and urgency, 

hematuria and low back pain for the last 2 days worse in the last 12 hours.  

Patient states that she has not been able to sleep because of worsening 

suprapubic pain, dysuria and urinary frequency and urgency with hematuria.  In 

the ED, patient is alert and oriented x3 and is not in distress.  Patient was 

treated for pain and also given normal saline 1 L IV bolus x1.  Patient also 

received antiemetics in the ED.  Lab test results were reviewed and showed acute

 leukocytosis of 18,200 and significant urinary tract infection in the 

urinalysis.  The rest of the lab test results are nonactionable.  Abdomen pelvis

 CT scan with contrast showed mild circumferential bladder wall thickening which

 could represent cystitis. Correlate with urinalysis.  It also showed 

indeterminate left adrenal nodules. The largest and measures 2.5 cm which is 

larger than prior examination in which it measured 1.1 m. Recommend MRI with and

 without contrast or multiphase CT adrenal protocol for further traci

acterization.  Patient received Rocephin 1 g IV x1 in the ED.  On reevaluation, 

patient's pain is well controlled medications.  Patient was discharged home on 

pain medications and antibiotics and advised to follow-up with her primary care 

physician in 5 to 7 days for reevaluation or return to the ED immediately if 

symptoms get worse.





- Differential Diagnosis


UTI; pyelonephritis; kidney stones; colitis; PID; fibroid; pregnancy


Critical care attestation.: 


If time is entered above; I have spent that time in minutes in the direct care 

of this critically ill patient, excluding procedure time.








ED Disposition


Clinical Impression: 


 Acute suprapubic pain, Acute cystitis with hematuria





Disposition: DC-01 TO HOME OR SELFCARE


Is pt being admited?: No


Does the pt Need Aspirin: No


Condition: Stable


Instructions:  Abdominal Pain, Adult, Easy-to-Read, Urinary Tract Infection, 

Adult, Easy-to-Read


Additional Instructions: 


All lab test results were reviewed and imaging report also reviewed and showed 

acute urinary tract infection or cystitis.  Therefore take medications with 

food, drink plenty of fluids and follow-up with your primary care physician in 5

 to 7 days for reevaluation.  Return to the ED immediately if your symptoms get 

worse.


Prescriptions: 


Fluconazole [Diflucan TAB] 200 mg PO QDAY #2 tablet


cephALEXin [Keflex] 500 mg PO Q6HR #40 capsule


Ibuprofen [Motrin] 800 mg PO Q8HR PRN #30 tablet


 PRN Reason: Pain , Severe (7-10)


traMADoL [Ultram] 50 mg PO Q6HR PRN #12 tablet


 PRN Reason: Pain


Ondansetron [Zofran Odt] 4 mg PO Q6HR PRN #20 tab.rapdis


 PRN Reason: Nausea


Referrals: 


[facility] [Provider Group] - 7-10 days


Time of Disposition: 06:06


Print Language: ENGLISH

## 2021-03-18 NOTE — CAT SCAN REPORT
CT abdomen pelvis w con



INDICATION:

Patient complains of lower abdominal / Pelvic pain. 



COMPARISON: 7/7/2016



TECHNIQUE: Abdominal and pelvic CT exam performed. All CT scans at this location are performed using 
CT dose reduction for ALARA by means of automated exposure control.



FINDINGS:



CT ABDOMEN and PELVIS:

Lung Bases: No significant abnormality.

Liver: No significant abnormality.

Biliary: No significant abnormality.

Spleen: No significant abnormality.

Pancreas: No significant abnormality.

Adrenals: There are multiple left adrenal nodules. The largest measures 2.5 cm left adrenal nodule wh
ich previously measured 1.1 cm in the lateral limb. Hounsfield units of this lesion are approximately
 55.  There also 2 other small adrenal lesions; medial adrenal lesion seen on image 32 of series 2 wh
ich is similar to prior and a lesion seen on image 50 which is larger than prior.

Kidneys: Interpolar right small renal cyst.

Lymphatics: No lymphadenopathy.

Vasculature: No significant abnormality. 

Bowel: No significant abnormality.  Normal appendix.

Pelvis: Mild cervical strain bladder wall thickening.

Osseous Structures: No aggressive osseous lesion.

Additional Findings: None



IMPRESSION:

1. Mild circumferential bladder wall thickening which could represent cystitis. Correlate with urinal
ysis.



2. Indeterminate left adrenal nodules. The largest and measures 2.5 cm which is larger than prior exa
mination in which it measured 1.1 m. Recommend MRI with and without contrast or multiphase CT adrenal
 protocol for further characterization.



Signer Name: Timothy Del Rosario MD 

Signed: 3/18/2021 5:15 AM

Workstation Name: [id number]

## 2021-05-12 ENCOUNTER — HOSPITAL ENCOUNTER (EMERGENCY)
Dept: HOSPITAL 5 - ED | Age: 36
Discharge: LEFT BEFORE DISCHARGE | End: 2021-05-12
Payer: SELF-PAY

## 2021-05-12 VITALS — SYSTOLIC BLOOD PRESSURE: 146 MMHG | DIASTOLIC BLOOD PRESSURE: 101 MMHG

## 2021-05-12 DIAGNOSIS — R06.02: Primary | ICD-10-CM

## 2021-05-12 DIAGNOSIS — Z53.21: ICD-10-CM

## 2021-05-12 LAB
BENZODIAZEPINES SCREEN,URINE: (no result)
BILIRUB UR QL STRIP: (no result)
BLOOD UR QL VISUAL: (no result)
BUN SERPL-MCNC: 8 MG/DL (ref 7–17)
BUN/CREAT SERPL: 13 %
CALCIUM SERPL-MCNC: 8.8 MG/DL (ref 8.4–10.2)
HCT VFR BLD CALC: 39.5 % (ref 30.3–42.9)
HEMOLYSIS INDEX: 5
HGB BLD-MCNC: 13.2 GM/DL (ref 10.1–14.3)
MCHC RBC AUTO-ENTMCNC: 34 % (ref 30–34)
MCV RBC AUTO: 82 FL (ref 79–97)
METHADONE SCREEN,URINE: (no result)
OPIATE SCREEN,URINE: (no result)
PH UR STRIP: 9 [PH] (ref 5–7)
PLATELET # BLD: 310 K/MM3 (ref 140–440)
PROT UR STRIP-MCNC: (no result) MG/DL
RBC # BLD AUTO: 4.8 M/MM3 (ref 3.65–5.03)
RBC #/AREA URNS HPF: < 1 /HPF (ref 0–6)
UROBILINOGEN UR-MCNC: < 2 MG/DL (ref ?–2)
WBC #/AREA URNS HPF: < 1 /HPF (ref 0–6)

## 2021-05-12 PROCEDURE — 85027 COMPLETE CBC AUTOMATED: CPT

## 2021-05-12 PROCEDURE — 80048 BASIC METABOLIC PNL TOTAL CA: CPT

## 2021-05-12 PROCEDURE — 80307 DRUG TEST PRSMV CHEM ANLYZR: CPT

## 2021-05-12 PROCEDURE — 36415 COLL VENOUS BLD VENIPUNCTURE: CPT

## 2021-05-12 PROCEDURE — 93005 ELECTROCARDIOGRAM TRACING: CPT

## 2021-05-12 PROCEDURE — 81001 URINALYSIS AUTO W/SCOPE: CPT

## 2021-05-12 PROCEDURE — 81025 URINE PREGNANCY TEST: CPT

## 2021-05-13 NOTE — ELECTROCARDIOGRAPH REPORT
[facility]

                                       

Test Date:    2021               Test Time:    06:12:30

Pat Name:     CK POSEY         Department:   

Patient ID:   [id number]          Room:          

Gender:       F                        Technician:   JOSE RAMON

:          1985               Requested By: ED DOC

Order Number: [id number]              Reading MD:   Lynette Justice

                                 Measurements

Intervals                              Axis          

Rate:         105                      P:            36

TX:           167                      QRS:          39

QRSD:         86                       T:            50

QT:           356                                    

QTc:          472                                    

                           Interpretive Statements

Sinus tachycardia

Inferior infarct, old

No previous ECG available for comparison

Electronically Signed On 2021 11:59:29 EDT by Lynette Justice

## 2022-06-16 ENCOUNTER — HOSPITAL ENCOUNTER (EMERGENCY)
Dept: HOSPITAL 5 - ED | Age: 37
Discharge: HOME | End: 2022-06-16
Payer: SELF-PAY

## 2022-06-16 VITALS — DIASTOLIC BLOOD PRESSURE: 97 MMHG | SYSTOLIC BLOOD PRESSURE: 158 MMHG

## 2022-06-16 DIAGNOSIS — Z79.899: ICD-10-CM

## 2022-06-16 DIAGNOSIS — J45.909: ICD-10-CM

## 2022-06-16 DIAGNOSIS — R07.9: Primary | ICD-10-CM

## 2022-06-16 DIAGNOSIS — F17.200: ICD-10-CM

## 2022-06-16 DIAGNOSIS — I10: ICD-10-CM

## 2022-06-16 LAB
ALBUMIN SERPL-MCNC: 4.5 G/DL (ref 3.9–5)
ALT SERPL-CCNC: 16 UNITS/L (ref 7–56)
BASOPHILS # (AUTO): 0.1 K/MM3 (ref 0–0.1)
BASOPHILS NFR BLD AUTO: 0.6 % (ref 0–1.8)
BUN SERPL-MCNC: 14 MG/DL (ref 7–17)
BUN/CREAT SERPL: 23 %
CALCIUM SERPL-MCNC: 9 MG/DL (ref 8.4–10.2)
EOSINOPHIL # BLD AUTO: 0.1 K/MM3 (ref 0–0.4)
EOSINOPHIL NFR BLD AUTO: 0.8 % (ref 0–4.3)
HCT VFR BLD CALC: 37.4 % (ref 30.3–42.9)
HEMOLYSIS INDEX: 11
HGB BLD-MCNC: 12.6 GM/DL (ref 10.1–14.3)
LYMPHOCYTES # BLD AUTO: 1.7 K/MM3 (ref 1.2–5.4)
LYMPHOCYTES NFR BLD AUTO: 15 % (ref 13.4–35)
MCHC RBC AUTO-ENTMCNC: 34 % (ref 30–34)
MCV RBC AUTO: 80 FL (ref 79–97)
MONOCYTES # (AUTO): 1.2 K/MM3 (ref 0–0.8)
MONOCYTES % (AUTO): 10.2 % (ref 0–7.3)
PLATELET # BLD: 260 K/MM3 (ref 140–440)
RBC # BLD AUTO: 4.67 M/MM3 (ref 3.65–5.03)

## 2022-06-16 PROCEDURE — 84702 CHORIONIC GONADOTROPIN TEST: CPT

## 2022-06-16 PROCEDURE — 99284 EMERGENCY DEPT VISIT MOD MDM: CPT

## 2022-06-16 PROCEDURE — 80053 COMPREHEN METABOLIC PANEL: CPT

## 2022-06-16 PROCEDURE — 93005 ELECTROCARDIOGRAM TRACING: CPT

## 2022-06-16 PROCEDURE — 36415 COLL VENOUS BLD VENIPUNCTURE: CPT

## 2022-06-16 PROCEDURE — 84484 ASSAY OF TROPONIN QUANT: CPT

## 2022-06-16 PROCEDURE — 71046 X-RAY EXAM CHEST 2 VIEWS: CPT

## 2022-06-16 PROCEDURE — 85025 COMPLETE CBC W/AUTO DIFF WBC: CPT

## 2022-06-16 NOTE — EMERGENCY DEPARTMENT REPORT
ED Chest Pain HPI





- General


Chief Complaint: Chest Pain


Stated Complaint: CHEST PAIN/NUMBNESS


Time Seen by Provider: 06/16/22 20:03


Source: patient


Mode of arrival: Ambulatory


Limitations: No Limitations





- History of Present Illness


Initial Comments: 


Is a 36-year-old female with history of hypertension and asthma presents for 

chest pain with left upper extremity numbness x2 months.  Symptoms are 

intermittent rated at 4/5.  Patient denies shortness of breath no nausea no 

vomiting no fever chills no malaise.  Symptoms are exacerbated by activity.  

Symptoms are relieved by nothing tried.  Patient denies smoking occasional EtOH.

 Patient denies other symptoms denies suspicious travel or exposure event.  

Patient is not COVID vaccinated.





MD Complaint: chest pain


Severity scale (0 -10): 7





- Related Data


                                Home Medications











 Medication  Instructions  Recorded  Confirmed  Last Taken


 


Valsartan-Hctz 160-25 mg Tab 1 tab PO DAILY 01/02/17 01/02/17 Unknown








                                  Previous Rx's











 Medication  Instructions  Recorded  Last Taken  Type


 


Diphenoxylate HCl/Atropine 1 each PO TID PRN #10 tablet 01/02/17 Unknown Rx





[Lomotil 2.5-0.025 mg Tablet]    


 


HYDROcodone/APAP 5-325 [[place] 1 each PO Q6HR PRN #20 tablet 01/02/17 Unknown Rx





5/325]    


 


Naproxen [Naprosyn] 500 mg PO BID #14 tablet 02/28/18 Unknown Rx


 


amLODIPine 5 mg PO DAILY #30 tab 08/06/19 Unknown Rx


 


Fluconazole [Diflucan TAB] 200 mg PO QDAY #2 tablet 03/18/21 Unknown Rx


 


Ibuprofen [Motrin] 800 mg PO Q8HR PRN #30 tablet 03/18/21 Unknown Rx


 


Ondansetron [Zofran Odt] 4 mg PO Q6HR PRN #20 tab.rapdis 03/18/21 Unknown Rx


 


cephALEXin [Keflex] 500 mg PO Q6HR #40 capsule 03/18/21 Unknown Rx


 


traMADoL [Ultram] 50 mg PO Q6HR PRN #12 tablet 03/18/21 Unknown Rx


 


Naproxen 500 mg PO BID PRN #30 tab 06/16/22 Unknown Rx











                                    Allergies











Allergy/AdvReac Type Severity Reaction Status Date / Time


 


No Known Allergies Allergy   Verified 02/27/18 17:38














Heart Score





- HEART Score


History: Slightly suspicious


EKG: Normal


Age: < 45


Risk factors: No known risk factors


Troponin: < normal limit


HEART Score: 0





- EKG Read Time


Time EKG Completed: 12:37


EKG Read Time: 12:40





ED Review of Systems


ROS: 


Stated complaint: CHEST PAIN/NUMBNESS


Other details as noted in HPI





Constitutional: denies: chills, fever


Eyes: denies: eye pain, eye discharge, vision change


ENT: congestion.  denies: ear pain, throat pain


Respiratory: cough.  denies: shortness of breath, wheezing


Cardiovascular: chest pain.  denies: dyspnea on exertion, paroxysmal nocturnal 

dyspnea


Endocrine: no symptoms reported


Gastrointestinal: denies: abdominal pain, nausea, vomiting, diarrhea


Genitourinary: denies: urgency, dysuria, discharge


Musculoskeletal: denies: back pain, joint swelling, arthralgia


Skin: denies: rash, lesions


Neurological: denies: headache, weakness, numbness, paresthesias, confusion, 

vertigo


Psychiatric: denies: anxiety, depression


Hematological/Lymphatic: denies: easy bleeding, easy bruising





ED Past Medical Hx





- Past Medical History


Hx Hypertension: Yes


Hx Asthma: Yes





- Surgical History


Additional Surgical History: cyst removal to L neck





- Social History


Smoking Status: Current Some Day Smoker





- Medications


Home Medications: 


                                Home Medications











 Medication  Instructions  Recorded  Confirmed  Last Taken  Type


 


Diphenoxylate HCl/Atropine 1 each PO TID PRN #10 tablet 01/02/17  Unknown Rx





[Lomotil 2.5-0.025 mg Tablet]     


 


HYDROcodone/APAP 5-325 [[place] 1 each PO Q6HR PRN #20 tablet 01/02/17  Unknown Rx





5/325]     


 


Valsartan-Hctz 160-25 mg Tab 1 tab PO DAILY 01/02/17 01/02/17 Unknown History


 


Naproxen [Naprosyn] 500 mg PO BID #14 tablet 02/28/18  Unknown Rx


 


amLODIPine 5 mg PO DAILY #30 tab 08/06/19  Unknown Rx


 


Fluconazole [Diflucan TAB] 200 mg PO QDAY #2 tablet 03/18/21  Unknown Rx


 


Ibuprofen [Motrin] 800 mg PO Q8HR PRN #30 tablet 03/18/21  Unknown Rx


 


Ondansetron [Zofran Odt] 4 mg PO Q6HR PRN #20 tab.rapdis 03/18/21  Unknown Rx


 


cephALEXin [Keflex] 500 mg PO Q6HR #40 capsule 03/18/21  Unknown Rx


 


traMADoL [Ultram] 50 mg PO Q6HR PRN #12 tablet 03/18/21  Unknown Rx


 


Naproxen 500 mg PO BID PRN #30 tab 06/16/22  Unknown Rx














ED Physical Exam





- General


Limitations: No Limitations


General appearance: alert, in no apparent distress





- Head


Head exam: Present: normocephalic, normal inspection





- Eye


Eye exam: Present: EOMI


Pupils: Present: normal accommodation





- ENT


ENT exam: Present: mucous membranes moist





- Neck


Neck exam: Present: normal inspection, full ROM.  Absent: tenderness, 

lymphadenopathy





- Respiratory


Respiratory exam: Present: normal lung sounds bilaterally.  Absent: respiratory 

distress, wheezes, rales, rhonchi, stridor, chest wall tenderness





- Cardiovascular


Cardiovascular Exam: Present: regular rate, normal rhythm, normal heart sounds. 

 Absent: systolic murmur, diastolic murmur, rubs, gallop





- GI/Abdominal


GI/Abdominal exam: Present: soft, normal bowel sounds.  Absent: distended, 

tenderness





- Rectal


Rectal exam: Present: deferred





- Extremities Exam


Extremities exam: Present: normal inspection, full ROM, normal capillary refill.

  Absent: tenderness, pedal edema





- Back Exam


Back exam: Present: normal inspection, full ROM.  Absent: CVA tenderness (R), 

CVA tenderness (L)





- Neurological Exam


Neurological exam: Present: alert, oriented X3, CN II-XII intact, normal gait





- Expanded Neurological Exam


  ** Expanded


Patient oriented to: Present: person, place, time


Speech: Present: fluid speech


Motor strength exam: RUE: 5, LUE: 5, RLE: 5, LLE: 5


Best Eye Response (Adan): (4) open spontaneously


Best Motor Response (Adan): (6) obeys commands


Best Verbal Response (Adan): (5) oriented


Adan Total: 15





- Psychiatric


Psychiatric exam: Present: normal affect, normal mood





- Skin


Skin exam: Present: warm, dry, intact, normal color





ED Course


                                   Vital Signs











  06/16/22 06/16/22





  11:54 21:06


 


Temperature 98 F 


 


Pulse Rate 86 78


 


Respiratory 16 14





Rate  


 


Blood Pressure 136/92 158/97





[Left]  


 


O2 Sat by Pulse 99 100





Oximetry  














ORESTES score





- Orestes Score


Age > 65: (0) No


Aspirin use within the Past 7 Days: (0) No


3 or more CAD Risk Factors: (0) No


2 or more Angina events in past 24 hrs: (0) No


Known CAD with more than 50% Stenosis: (0) No


Elevated Cardiac Markers: (0) No


ST Deviation Greater than 0.5mm: (0) No


ORESTES Score: 0





ED Medical Decision Making





- Lab Data


Result diagrams: 


                                 06/16/22 20:29





                                 06/16/22 20:29





- EKG Data


EKG shows normal: sinus rhythm, axis, intervals, QRS complexes, ST-T waves


Rate: normal





- EKG Data


When compared to previous EKG there are: previous EKG unavailable


Interpretation: normal EKG (Normal sinus rhythm no ST elevated MI interpreted by

 ED attending)





- Radiology Data


Radiology results: report reviewed, image reviewed


CHEST 2 VIEWS   


 


 INDICATION / CLINICAL INFORMATION:  


 chest pain.  


 


 COMPARISON:   


 2 views of the chest from 8/6/2019.  


 


 FINDINGS:  


 


 SUPPORT DEVICES: None.  


 HEART / MEDIASTINUM: No significant abnormality.   


 LUNGS / PLEURA: No significant pulmonary abnormality. No significant pleural 

effusion. No 


pneumothorax.   


 


 ADDITIONAL FINDINGS: No significant additional findings.  


 


 IMPRESSION:  


 1. No acute abnormality of the chest.  


 


 Signer Name: Jani Byrd MD   


 Signed: 6/16/2022 10:23 PM  


 Workstation Name: [id number]   


 


 


Transcribed By: MN  


Dictated By: Jani Byrd MD  


Electronically Authenticated By: Jani Byrd MD    


Signed Date/Time: 06/16/22 2223                                


 


 


 


DD/DT: 06/16/22 2223                                                            

  


TD/TT:











- Medical Decision Making


EKG normal sinus rhythm no ST elevated MI.  Chest x-ray normal no infiltrates no

 opacities, labs noted normal troponin is less than 0.01x2 heart score 0 ORESTES 

score 0, plan DC to home, NSAIDs as needed for chest pain.  Follow-up with 

primary care doctor in 2 to 3 days days.  This is nonspecific chest pain





Critical care attestation.: 


If time is entered above; I have spent that time in minutes in the direct care 

of this critically ill patient, excluding procedure time.








ED Disposition


Clinical Impression: 


 Nonspecific chest pain





Disposition: 01 HOME / SELF CARE / HOMELESS


Is pt being admited?: No


Does the pt Need Aspirin: No


Condition: Stable


Instructions:  Nonspecific Chest Pain, Adult


Additional Instructions: 


Take medications as prescribed for pain.  Follow-up with your doctor in 2 to 3 

days.  Return to emergency department should symptoms worsen.


Prescriptions: 


Naproxen 500 mg PO BID PRN #30 tab


 PRN Reason: pain


Referrals: 


CLAIRE TAVERAS MD [Primary Care Provider] - 3-5 Days


Forms:  Work/School Release Form(ED)


Time of Disposition: 23:07

## 2022-06-16 NOTE — XRAY REPORT
CHEST 2 VIEWS 



INDICATION / CLINICAL INFORMATION:

chest pain.



COMPARISON: 

2 views of the chest from 8/6/2019.



FINDINGS:



SUPPORT DEVICES: None.

HEART / MEDIASTINUM: No significant abnormality. 

LUNGS / PLEURA: No significant pulmonary abnormality. No significant pleural effusion. No pneumothora
x. 



ADDITIONAL FINDINGS: No significant additional findings.



IMPRESSION:

1. No acute abnormality of the chest.



Signer Name: Jani Byrd MD 

Signed: 6/16/2022 10:23 PM

Workstation Name: [id number]

## 2022-06-17 NOTE — ELECTROCARDIOGRAPH REPORT
[facility]

                                       

Test Date:    2022               Test Time:    12:37:28

Pat Name:     CK POSEY         Department:   

Patient ID:   [id number]          Room:          

Gender:       F                        Technician:   MEDINA

:          1985               Requested By: ED DOC

Order Number: [id number]              Reading MD:   Harpal Guerrero

                                 Measurements

Intervals                              Axis          

Rate:         70                       P:            -2

SC:           165                      QRS:          42

QRSD:         78                       T:            90

QT:           386                                    

QTc:          418                                    

                           Interpretive Statements

Sinus rhythm

Compared to ECG 2021 06:12:30

Sinus tachycardia no longer present

Myocardial infarct finding no longer present

Electronically Signed On 2022 11:26:29 EDT by Harpal Guerrero